# Patient Record
Sex: FEMALE | Race: WHITE | URBAN - METROPOLITAN AREA
[De-identification: names, ages, dates, MRNs, and addresses within clinical notes are randomized per-mention and may not be internally consistent; named-entity substitution may affect disease eponyms.]

---

## 2018-08-22 ENCOUNTER — THERAPY VISIT (OUTPATIENT)
Dept: OCCUPATIONAL THERAPY | Facility: CLINIC | Age: 33
End: 2018-08-22
Payer: MEDICAID

## 2018-08-22 DIAGNOSIS — M25.532 LEFT WRIST PAIN: Primary | ICD-10-CM

## 2018-08-22 PROCEDURE — 97165 OT EVAL LOW COMPLEX 30 MIN: CPT | Mod: GO | Performed by: OCCUPATIONAL THERAPIST

## 2018-08-22 PROCEDURE — 97110 THERAPEUTIC EXERCISES: CPT | Mod: GO | Performed by: OCCUPATIONAL THERAPIST
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
## 2018-08-22 NOTE — MR AVS SNAPSHOT
After Visit Summary   8/22/2018    Olivia Solis    MRN: [id number]           Patient Information     Date Of Birth          1985        Visit Information        Provider Department      8/22/2018 9:00 AM Sylvia Wilkinson OT [facility]        Today's Diagnoses     Left wrist pain    -  1       Follow-ups after your visit        Your next 10 appointments already scheduled     Aug 30, 2018 10:30 AM CDT   [facility] Hand with [facility] ([facility])    [address] [place] [id number] [phone]            Sep 05, 2018 10:00 AM CDT   [facility] Hand with [facility] ([facility])    [address] [place] [address] [id number] [phone]            Sep 12, 2018 10:00 AM CDT   [facility] Hand with [facility] ([facility])    [address] [place] [address] [id number] [phone]              Who to contact     If you have questions or need follow up information about today's clinic visit or your schedule please contact [facility] directly at [phone].  Normal or non-critical lab and imaging results will be communicated to you by MyChart, letter or phone within 4 business days after the clinic has received the results. If you do not hear from us within 7 days, please contact the clinic through MyChart or phone. If you have a critical or abnormal lab result, we will notify you by phone as soon as possible.  Submit refill requests through [employer] or call your pharmacy and they will forward the refill request to us. Please allow 3 business days for your refill to be completed.          Additional Information About Your Visit        Care EveryWhere ID     This is your Care EveryWhere ID. This could be used by other organizations to access your [facility]  records  [id number]         Blood Pressure from Last 3 Encounters:   No data found for BP    Weight from Last 3 Encounters:   No data found for Wt              We Performed the Following     HC OT EVAL, LOW COMPLEXITY     JUNG INITIAL EVAL REPORT     THERAPEUTIC EXERCISES        Primary Care Provider    None Specified       No primary provider on file.        Equal Access to Services     MIGUEL CONTRERAS : Hadii aad clark hadsonao Soomaali, waaxda luqadaha, qaybta kaalmada adeegyada, radha merinorogeriomilly fernandez . So [facility] [phone].    ATENCIÓN: Si habla español, tiene a rivas disposición servicios gratuitos de asistencia lingüística. Llame al [phone].    We comply with applicable federal civil rights laws and [place] laws. We do not discriminate on the basis of race, color, national origin, age, disability, sex, sexual orientation, or gender identity.            Thank you!     Thank you for choosing [facility]  for your care. Our goal is always to provide you with excellent care. Hearing back from our patients is one way we can continue to improve our services. Please take a few minutes to complete the written survey that you may receive in the mail after your visit with us. Thank you!             Your Updated Medication List - Protect others around you: Learn how to safely use, store and throw away your medicines at www.disposemymeds.org.      Notice  As of 8/22/2018 11:47 AM    You have not been prescribed any medications.
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
## 2018-08-30 ENCOUNTER — THERAPY VISIT (OUTPATIENT)
Dept: OCCUPATIONAL THERAPY | Facility: CLINIC | Age: 33
End: 2018-08-30
Payer: MEDICAID

## 2018-08-30 DIAGNOSIS — M25.532 LEFT WRIST PAIN: ICD-10-CM

## 2018-08-30 PROCEDURE — 97763 ORTHC/PROSTC MGMT SBSQ ENC: CPT | Mod: GO | Performed by: OCCUPATIONAL THERAPIST

## 2018-08-30 PROCEDURE — 97535 SELF CARE MNGMENT TRAINING: CPT | Mod: GO | Performed by: OCCUPATIONAL THERAPIST

## 2018-08-31 ENCOUNTER — TELEPHONE (OUTPATIENT)
Dept: ORTHOPEDICS | Facility: CLINIC | Age: 33
End: 2018-08-31

## 2018-08-31 NOTE — TELEPHONE ENCOUNTER
Hand therapist Virginia is referring patient to a hand surgeon to evaluate left wrist DRUJ laxity.  Message left on patient's voicemail to call the clinic.  Appointment can  be scheduled with Dr Peña 09/10/2018.

## 2018-09-05 ENCOUNTER — THERAPY VISIT (OUTPATIENT)
Dept: OCCUPATIONAL THERAPY | Facility: CLINIC | Age: 33
End: 2018-09-05
Payer: MEDICAID

## 2018-09-05 DIAGNOSIS — M25.532 LEFT WRIST PAIN: ICD-10-CM

## 2018-09-05 PROCEDURE — 97140 MANUAL THERAPY 1/> REGIONS: CPT | Mod: GO | Performed by: OCCUPATIONAL THERAPIST

## 2018-09-05 PROCEDURE — 97110 THERAPEUTIC EXERCISES: CPT | Mod: GO | Performed by: OCCUPATIONAL THERAPIST

## 2018-09-05 PROCEDURE — 97035 APP MDLTY 1+ULTRASOUND EA 15: CPT | Mod: GO | Performed by: OCCUPATIONAL THERAPIST

## 2018-09-11 ENCOUNTER — PRE VISIT (OUTPATIENT)
Dept: ORTHOPEDICS | Facility: CLINIC | Age: 33
End: 2018-09-11

## 2018-09-11 NOTE — TELEPHONE ENCOUNTER
FUTURE VISIT INFORMATION      FUTURE VISIT INFORMATION:    Date: 9/12    Time: 11:00    Location: [facility]  REFERRAL INFORMATION:    Referring provider: Roxana Zamora    Referring providers clinic:  [facility]    Reason for visit/diagnosis  left wrist DRUJ laxity    RECORDS REQUESTED FROM:       Clinic name Comments Records Status Imaging Status                                         RECORDS STATUS      All records internal

## 2018-09-12 ENCOUNTER — OFFICE VISIT (OUTPATIENT)
Dept: ORTHOPEDICS | Facility: CLINIC | Age: 33
End: 2018-09-12
Payer: MEDICAID

## 2018-09-12 ENCOUNTER — RADIANT APPOINTMENT (OUTPATIENT)
Dept: GENERAL RADIOLOGY | Facility: CLINIC | Age: 33
End: 2018-09-12
Attending: ORTHOPAEDIC SURGERY
Payer: MEDICAID

## 2018-09-12 ENCOUNTER — THERAPY VISIT (OUTPATIENT)
Dept: OCCUPATIONAL THERAPY | Facility: CLINIC | Age: 33
End: 2018-09-12
Payer: MEDICAID

## 2018-09-12 VITALS — WEIGHT: 128.3 LBS | BODY MASS INDEX: 21.38 KG/M2 | HEIGHT: 65 IN

## 2018-09-12 DIAGNOSIS — M25.532 LEFT WRIST PAIN: Primary | ICD-10-CM

## 2018-09-12 DIAGNOSIS — M25.532 LEFT WRIST PAIN: ICD-10-CM

## 2018-09-12 ASSESSMENT — ENCOUNTER SYMPTOMS
HOT FLASHES: 0
DECREASED LIBIDO: 0

## 2018-09-12 NOTE — LETTER
Date:September 13, 2018      Patient was self referred, no letter generated. Do not send.        [facility] Health Information
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
## 2018-09-12 NOTE — MR AVS SNAPSHOT
"              After Visit Summary   9/12/2018    Olivia Solis    MRN: [id number]           Patient Information     Date Of Birth          1985        Visit Information        Provider Department      9/12/2018 11:00 AM Alen Peña MD [facility]        Today's Diagnoses     Left wrist pain    -  1       Follow-ups after your visit        Future tests that were ordered for you today     Open Future Orders        Priority Expected Expires Ordered    MR Wrist Left w Contrast Routine  9/12/2019 9/12/2018            Who to contact     Please call your clinic at [phone] to:    Ask questions about your health    Make or cancel appointments    Discuss your medicines    Learn about your test results    Speak to your doctor            Additional Information About Your Visit        Care EveryWhere ID     This is your Care EveryWhere ID. This could be used by other organizations to access your [place] medical records  [id number]        Your Vitals Were     Height BMI (Body Mass Index)                1.638 m (5' 4.5\") 21.68 kg/m2           Blood Pressure from Last 3 Encounters:   No data found for BP    Weight from Last 3 Encounters:   09/12/18 58.2 kg (128 lb 4.8 oz)               Primary Care Provider Fax #    Physician No Ref-Primary [phone]       No address on file        Equal Access to Services     WILMA CONTRERAS : Hadii pedro moraleso Sodarshana, waaxda luqadaha, qaybta kaalmada adeegyada, radha springer. So [facility] [phone].    ATENCIÓN: Si habla español, tiene a rivas disposición servicios gratuitos de asistencia lingüística. Llame al [phone].    We comply with applicable federal civil rights laws and [place] laws. We do not discriminate on the basis of race, color, national origin, age, disability, sex, sexual orientation, or gender identity.            Thank you!     Thank you for choosing [facility]  for your care. Our goal " is always to provide you with excellent care. Hearing back from our patients is one way we can continue to improve our services. Please take a few minutes to complete the written survey that you may receive in the mail after your visit with us. Thank you!             Your Updated Medication List - Protect others around you: Learn how to safely use, store and throw away your medicines at www.disposemymeds.org.      Notice  As of 9/12/2018  4:41 PM    You have not been prescribed any medications.
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
## 2018-09-12 NOTE — MR AVS SNAPSHOT
After Visit Summary   9/12/2018    Olivia Solis    MRN: [id number]           Patient Information     Date Of Birth          1985        Visit Information        Provider Department      9/12/2018 12:00 PM Lynn Astorga OT [facility]        Today's Diagnoses     Left wrist pain           Follow-ups after your visit        Future tests that were ordered for you today     Open Future Orders        Priority Expected Expires Ordered    MR Wrist Left w Contrast Routine  9/12/2019 9/12/2018            Who to contact     If you have questions or need follow up information about today's clinic visit or your schedule please contact [facility] directly at [phone].  Normal or non-critical lab and imaging results will be communicated to you by MyChart, letter or phone within 4 business days after the clinic has received the results. If you do not hear from us within 7 days, please contact the clinic through MyChart or phone. If you have a critical or abnormal lab result, we will notify you by phone as soon as possible.  Submit refill requests through [employer] or call your pharmacy and they will forward the refill request to us. Please allow 3 business days for your refill to be completed.          Additional Information About Your Visit        Care EveryWhere ID     This is your Care EveryWhere ID. This could be used by other organizations to access your [facility] medical records  [id number]         Blood Pressure from Last 3 Encounters:   No data found for BP    Weight from Last 3 Encounters:   09/12/18 58.2 kg (128 lb 4.8 oz)              We Performed the Following     NO CHARGE LOS        Primary Care Provider Fax #    Physician No Ref-Primary [phone]       No address on file        Equal Access to Services     MIGUEL CONTRERAS : Avril Wilks, wakeyshawn luqadaha, qaybta kaalmaleia mancini, radha springer. So [facility]  [phone].    ATENCIÓN: Si habla leanna, tiene a rivas disposición servicios gratuitos de asistencia lingüística. Llame al [phone].    We comply with applicable federal civil rights laws and [place] laws. We do not discriminate on the basis of race, color, national origin, age, disability, sex, sexual orientation, or gender identity.            Thank you!     Thank you for choosing [facility]  for your care. Our goal is always to provide you with excellent care. Hearing back from our patients is one way we can continue to improve our services. Please take a few minutes to complete the written survey that you may receive in the mail after your visit with us. Thank you!             Your Updated Medication List - Protect others around you: Learn how to safely use, store and throw away your medicines at www.disposemymeds.org.      Notice  As of 9/12/2018 12:39 PM    You have not been prescribed any medications.

## 2018-09-25 ENCOUNTER — THERAPY VISIT (OUTPATIENT)
Dept: OCCUPATIONAL THERAPY | Facility: CLINIC | Age: 33
End: 2018-09-25
Payer: MEDICAID

## 2018-09-25 ENCOUNTER — HOSPITAL ENCOUNTER (OUTPATIENT)
Age: 33
End: 2018-09-25
Attending: ORTHOPAEDIC SURGERY
Payer: COMMERCIAL

## 2018-09-25 DIAGNOSIS — M25.532 LEFT WRIST PAIN: ICD-10-CM

## 2018-09-25 PROCEDURE — 97110 THERAPEUTIC EXERCISES: CPT | Mod: GO | Performed by: OCCUPATIONAL THERAPIST
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
## 2018-09-25 NOTE — MR AVS SNAPSHOT
After Visit Summary   9/25/2018    Olivia Solis    MRN: [id number]           Patient Information     Date Of Birth          1985        Visit Information        Provider Department      9/25/2018 9:30 AM Roxana Juarez OT [facility]        Today's Diagnoses     Left wrist pain           Follow-ups after your visit        Your next 10 appointments already scheduled     Oct 01, 2018  1:00 PM CDT   XR GADOLINIUM INJECTION with SH MSK RAD, SH IMAGING NURSE   [facility] ([facility])    [address] [address] [phone]           How do I prepare for my exam? (Food and drink instructions) No Food and Drink Restrictions.  How do I prepare for my exam? (Other instructions) You do not need to do anything special for this exam.  What should I wear: Wear comfortable clothes.  How long does the exam take: Most scans take less than 5 minutes.  What should I bring: Bring a list of your medicines, including vitamins, minerals and over-the-counter drugs. It is safest to leave personal items at home.  Do I need a :  No  is needed.  What do I need to tell my doctor: Tell your doctor if there s any chance you are pregnant.  What should I do after the exam: No restrictions, You may resume normal activities.  What is this test: An image of a specific body part shown in shades of black and white.  Who should I call with questions: If you have any questions, please call the Imaging Department where you will have your exam. Directions, parking instructions, and other information is available on our website, [email or web address].            Oct 01, 2018  1:45 PM CDT   MR WRIST LEFT W CONTRAST with SHMRP2   [facility] MRI ([facility])    [address] [address] [phone]           How do I prepare for my exam? (Food and drink instructions) **If you will be receiving sedation  or general anesthesia, please see special notes below.**  How do I prepare for my exam? (Other instructions) Take your medicines as usual, unless your doctor tells you not to. You may or may not receive intravenous (IV) contrast for this exam pending the discretion of the Radiologist.  You do not need to do anything special to prepare.  **If you will be receiving sedation or general anesthesia, please see special notes below.**  What should I wear: The MRI machine uses a strong magnet. Please wear clothes without metal (snaps, zippers). A sweatsuit works well, or we may give you a hospital gown. Please remove any body piercings and hair extensions before you arrive. You will also remove watches, jewelry, hairpins, wallets, dentures, partial dental plates and hearing aids. You may wear contact lenses, and you may be able to wear your rings. We have a safe place to keep your personal items, but it is safer to leave them at home.  How long does the exam take: Most tests take 30 to 60 minutes.  HOWEVER, IF YOUR DOCTOR PRESCRIBES ANESTHESIA please plan on spending four to five hours in the recovery room.  What should I bring:  Bring a list of your current medicines to your exam (including vitamins, minerals and over-the-counter drugs).  Do I need a :  **If you will be receiving sedation or general anesthesia, please see special notes below.**  What should I do after the exam: No Restrictions, You may resume normal activities.  What is this test: MRI (magnetic resonance imaging) uses a strong magnet and radio waves to look inside the body. An MRA (magnetic resonance angiogram) does the same thing, but it lets us look at your blood vessels. A computer turns the radio waves into pictures showing cross sections of the body, much like slices of bread. This helps us see any problems more clearly. You may receive fluid (called  contrast ) before or during your scan. The fluid helps us see the pictures better. We give the  fluid through an IV (small needle in your arm).  Who should I call with questions:  Please call the Imaging Department at your exam site with any questions. Directions, parking instructions, and other information is available on our website, [email or web address].  How do I prepare if I m having sedation or anesthesia? **IMPORTANT** THE INSTRUCTIONS BELOW ARE ONLY FOR THOSE PATIENTS WHO HAVE BEEN TOLD THEY WILL RECEIVE SEDATION OR GENERAL ANESTHESIA DURING THEIR MRI PROCEDURE:  IF YOU WILL RECEIVE SEDATION (take medicine to help you relax during your exam): You must get the medicine from your doctor before you arrive. Bring the medicine to the exam. Do not take it at home. Arrive one hour early. Bring someone who can take you home after the test. Your medicine will make you sleepy. After the exam, you may not drive, take a bus or take a taxi by yourself. No eating 8 hours before your exam. You may have clear liquids up until 4 hours before your exam. (Clear liquids include water, clear tea, black coffee and fruit juice without pulp.)  IF YOU WILL RECEIVE ANESTHESIA (be asleep for your exam): Arrive 1 1/2 hours early. Bring someone who can take you home after the test. You may not drive, take a bus or take a taxi by yourself. No eating 8 hours before your exam. You may have clear liquids up until 4 hours before your exam. (Clear liquids include water, clear tea, black coffee and fruit juice without pulp.)            Oct 05, 2018 11:30 AM CDT   JUNG Hand with Roxana Juarez OT   [facility] ([facility])    [address] [address] [phone]              Who to contact     If you have questions or need follow up information about today's clinic visit or your schedule please contact [facility] directly at [phone].  Normal or non-critical lab and imaging results will be communicated to you by MyChart, letter or phone within 4  business days after the clinic has received the results. If you do not hear from us within 7 days, please contact the clinic through [employer] or phone. If you have a critical or abnormal lab result, we will notify you by phone as soon as possible.  Submit refill requests through [employer] or call your pharmacy and they will forward the refill request to us. Please allow 3 business days for your refill to be completed.          Additional Information About Your Visit        Care EveryWhere ID     This is your Care EveryWhere ID. This could be used by other organizations to access your [facility] medical records  [id number]         Blood Pressure from Last 3 Encounters:   No data found for BP    Weight from Last 3 Encounters:   09/12/18 58.2 kg (128 lb 4.8 oz)              We Performed the Following     THERAPEUTIC EXERCISES        Primary Care Provider Fax #    Physician No Ref-Primary [phone]       No address on file        Equal Access to Services     WILMA CONTRERAS : Hadii pedro Wilks, waaxleia gtz, karel kaalvanessa mancini, radha fernandez . So [facility] [phone].    ATENCIÓN: Si habla español, tiene a rivas disposición servicios gratuitos de asistencia lingüística. Llame al [phone].    We comply with applicable federal civil rights laws and [place] laws. We do not discriminate on the basis of race, color, national origin, age, disability, sex, sexual orientation, or gender identity.            Thank you!     Thank you for choosing [facility]  for your care. Our goal is always to provide you with excellent care. Hearing back from our patients is one way we can continue to improve our services. Please take a few minutes to complete the written survey that you may receive in the mail after your visit with us. Thank you!             Your Updated Medication List - Protect others around you: Learn how to safely use, store and throw away your medicines at  www.disposemymeds.org.      Notice  As of 9/25/2018  5:02 PM    You have not been prescribed any medications.

## 2018-10-02 ENCOUNTER — HOSPITAL ENCOUNTER (OUTPATIENT)
Dept: MRI IMAGING | Facility: CLINIC | Age: 33
End: 2018-10-02
Attending: ORTHOPAEDIC SURGERY
Payer: COMMERCIAL

## 2018-10-02 DIAGNOSIS — M25.532 LEFT WRIST PAIN: ICD-10-CM

## 2018-10-02 PROCEDURE — 73221 MRI JOINT UPR EXTREM W/O DYE: CPT | Mod: LT

## 2018-10-05 ENCOUNTER — THERAPY VISIT (OUTPATIENT)
Dept: OCCUPATIONAL THERAPY | Facility: CLINIC | Age: 33
End: 2018-10-05
Payer: COMMERCIAL

## 2018-10-05 ENCOUNTER — TELEPHONE (OUTPATIENT)
Dept: ORTHOPEDICS | Facility: CLINIC | Age: 33
End: 2018-10-05

## 2018-10-05 DIAGNOSIS — M25.532 LEFT WRIST PAIN: Primary | ICD-10-CM

## 2018-10-05 DIAGNOSIS — M25.532 LEFT WRIST PAIN: ICD-10-CM

## 2018-10-05 PROCEDURE — 97763 ORTHC/PROSTC MGMT SBSQ ENC: CPT | Mod: GO | Performed by: OCCUPATIONAL THERAPIST

## 2018-10-08 DIAGNOSIS — M25.532 LEFT WRIST PAIN: Primary | ICD-10-CM

## 2018-10-08 NOTE — PROGRESS NOTES
New MRI order placed per Dr. Peña.  MRI scheduled for 10/11/2018.  Patient was called with time and date of MRI.  Dr. Peña will call her with results once it is reviewed.

## 2018-10-08 NOTE — PROGRESS NOTES
Called patient to review results. The peripheral attachment of the TFCC looks atypical to me. I discussed with muscloskeletal radiologist- I am not convinced from these images that volar band of SHARRON ligament is primary pathology. He suggested 3T MRI with thin cuts of TFCC with LINA sequences. We will facilitate getting this scheduled promptly.

## 2018-10-11 ENCOUNTER — RADIANT APPOINTMENT (OUTPATIENT)
Dept: MRI IMAGING | Facility: CLINIC | Age: 33
End: 2018-10-11
Attending: ORTHOPAEDIC SURGERY
Payer: COMMERCIAL

## 2018-10-11 DIAGNOSIS — M25.532 LEFT WRIST PAIN: ICD-10-CM

## 2018-10-17 ENCOUNTER — OFFICE VISIT (OUTPATIENT)
Dept: ORTHOPEDICS | Facility: CLINIC | Age: 33
End: 2018-10-17
Payer: COMMERCIAL

## 2018-10-17 VITALS — WEIGHT: 128 LBS | BODY MASS INDEX: 21.33 KG/M2 | HEIGHT: 65 IN

## 2018-10-17 DIAGNOSIS — M25.532 LEFT WRIST PAIN: Primary | ICD-10-CM

## 2018-10-17 RX ORDER — TESTOSTERONE 10 MG/.5G
GEL, METERED TOPICAL EVERY MORNING
COMMUNITY
Start: 2018-03-19

## 2018-10-17 NOTE — PROGRESS NOTES
"[facility]  Orthopedics  Alen Peña MD  10/17/2018     Name: Olivia Solis  MRN: [id number]  Age: 33 year old  : 1985  Referring provider: Referred Self     Chief Complaint: RECHECK (The patient is following up today aftera left wrist MRI. )       History of Present Illness:   Olivia Solis is a 33 year old gender fluid individual, right-hand-dominant, who presents today for follow-up of left wrist pain and weakness. Patient reports that everything is going well currently and she thinks her pain has improved overall. The pain that she does have is localized over the ulnar aspect of the left wrist. She does note that certain high intensity activities, such as rock climbing and hand to hand acrobatics, exacerbate her pain, while other activities do not seem to bother her. Although she feels that her pain is improved, it is often worsened by increasing her volume of activity so she has to cut back. She'll find is that she'll increase her activity, become very painful again, rest, feel better, then ramp up but then having increased pain. She feels that overall the trajectory area for improvement may be positive but she is not sure. Still feels distal ulna apepars somewhat dorsally promiennt.       Review of Systems:   A 10-point review of systems was obtained and is negative except for as noted in the HPI.     Physical Examination:  Height 1.638 m (5' 4.5\"), weight 58.1 kg (128 lb).  General: Healthy appearing female. Affect appropriate. Normal gait. Alert and oriented to surroundings.   On examination of her Left Upper Extremity:   Skin is clean, dry, and intact. No effusion/swelling/deformity. She is nontender over the DRUJ. She has minimal pain over the fovea. She has mild pain over the ulnar styloid. Range of motion of the wrist is full and symmetric to the contralateral side. The DRUJ is perhaps subtly  lax compared to the conralateral side with shuck in neutral, pronation, supination " but piano key sign is negative. Dorsal ulna does not appear overly prominent. No discomfort with ulnar impaction testing. Sensation intact in the median, radial, and ulnar nerve distributions. Cap refill less than two seconds.    Imaging:  MR of Left Wrist (10/17/18)  1. Sprain of dorsal radioulnar ligament with possible intrasubstance  tear, but not full thickness tear.  2. Thinning of triangular fibrocartilage proper central portion  without full thickness tear on this non-arthrographic study. Distal  and proximal lamina on ulnar attachment are intact.  3. Moderate distal radioulnar joint effusion.  4. Mild ECU tendinosis with questionable very mild tenosynovitis.    Addendum:     Provided strong clinical concern of triangular fibrocartilage complex  ulnar attachment site injury/abnormality with the imaging showing  abnormal signal in the dorsal radioulnar ligament continuation to the  expected area of ligamentum subcruentum, superimposed partial tear,  particularly, proximal lamina of the triangular ligament may be  Present.    Per radiology.   On my read, I feel there is a distal radial ulnar joint effusion. There is some altered signal at the peripheral attachments of the TFCC on the fovea and styloid but no obvious tear.    Assessment:   33 year old, right handed female with left wrist pain. There is no olegario tear of the TFCC on MRI but there is some altered signal in it. She does have possibly some mildly increased translation of the DRUJ compared to the contralateral side, but this is subtle. Symptoms at last visit were quite concerning for possible TFCC tear. Today, she appears to be doing much better.     Plan:   It is not clear today if Olivia is doing better overall or if her symptoms have been fluctuating day to day but still relatively stable. I discussed with her that if she does feel that she is overall not progressing, I think it would be very reasonable to go ahead with a wrist arthroscopy to  take a look at her TFCC and repair it if a significant tear is present. However, if she feels she is overall getting better, in light of the lack of a olegario tear on MRI and the relatively minimal clinical signs today, I think it would be more adviseable to continue with a conservative approach. She will think about this and will let me know how she would like to proceed. She would like to see me again when she comes back from [place] to discuss further.     Alen Peña MD           Scribe Disclosure:   I, Orlando Ann, am serving as a scribe to document services personally performed by Alen Peña MD at this visit, based upon the provider's statements to me. All documentation has been reviewed by the aforementioned provider prior to being entered into the official medical record.

## 2018-10-17 NOTE — LETTER
Date:October 18, 2018      Patient was self referred, no letter generated. Do not send.        [facility] Health Information

## 2018-10-17 NOTE — LETTER
"10/17/2018       RE: Olivia Solis  [address] [place] [id number]     Dear Colleague,    Thank you for referring your patient, Olivia Solis, to the [facility] at [facility]. Please see a copy of my visit note below.    [facility]  Orthopedics  Alen Peña MD  10/17/2018     Name: Olivia Solis  MRN: [id number]  Age: 33 year old  : 1985  Referring provider: Referred Self     Chief Complaint: RECHECK (The patient is following up today aftera left wrist MRI. )       History of Present Illness:   Olivia Solis is a 33 year old gender fluid individual, right-hand-dominant, who presents today for follow-up of left wrist pain and weakness. Patient reports that everything is going well currently and she thinks her pain has improved overall. The pain that she does have is localized over the ulnar aspect of the left wrist. She does note that certain high intensity activities, such as rock climbing and hand to hand acrobatics, exacerbate her pain, while other activities do not seem to bother her. Although she feels that her pain is improved, it is often worsened by increasing her volume of activity so she has to cut back. She'll find is that she'll increase her activity, become very painful again, rest, feel better, then ramp up but then having increased pain. She feels that overall the trajectory area for improvement may be positive but she is not sure. Still feels distal ulna apepars somewhat dorsally promiennt.       Review of Systems:   A 10-point review of systems was obtained and is negative except for as noted in the HPI.     Physical Examination:  Height 1.638 m (5' 4.5\"), weight 58.1 kg (128 lb).  General: Healthy appearing female. Affect appropriate. Normal gait. Alert and oriented to surroundings.   On examination of her Left Upper Extremity:   Skin is clean, dry, and intact. No effusion/swelling/deformity. She is " nontender over the DRUJ. She has minimal pain over the fovea. She has mild pain over the ulnar styloid. Range of motion of the wrist is full and symmetric to the contralateral side. The DRUJ is perhaps subtly  lax compared to the conralateral side with shuck in neutral, pronation, supination but piano key sign is negative. Dorsal ulna does not appear overly prominent. No discomfort with ulnar impaction testing. Sensation intact in the median, radial, and ulnar nerve distributions. Cap refill less than two seconds.    Imaging:  MR of Left Wrist (10/17/18)  1. Sprain of dorsal radioulnar ligament with possible intrasubstance  tear, but not full thickness tear.  2. Thinning of triangular fibrocartilage proper central portion  without full thickness tear on this non-arthrographic study. Distal  and proximal lamina on ulnar attachment are intact.  3. Moderate distal radioulnar joint effusion.  4. Mild ECU tendinosis with questionable very mild tenosynovitis.    Addendum:     Provided strong clinical concern of triangular fibrocartilage complex  ulnar attachment site injury/abnormality with the imaging showing  abnormal signal in the dorsal radioulnar ligament continuation to the  expected area of ligamentum subcruentum, superimposed partial tear,  particularly, proximal lamina of the triangular ligament may be  Present.    Per radiology.   On my read, I feel there is a distal radial ulnar joint effusion. There is some altered signal at the peripheral attachments of the TFCC on the fovea and styloid but no obvious tear.    Assessment:   33 year old, right handed female with left wrist pain. There is no olegario tear of the TFCC on MRI but there is some altered signal in it. She does have possibly some mildly increased translation of the DRUJ compared to the contralateral side, but this is subtle. Symptoms at last visit were quite concerning for possible TFCC tear. Today, she appears to be doing much better.     Plan:   It is  not clear today if Olivia is doing better overall or if her symptoms have been fluctuating day to day but still relatively stable. I discussed with her that if she does feel that she is overall not progressing, I think it would be very reasonable to go ahead with a wrist arthroscopy to take a look at her TFCC and repair it if a significant tear is present. However, if she feels she is overall getting better, in light of the lack of a olegario tear on MRI and the relatively minimal clinical signs today, I think it would be more adviseable to continue with a conservative approach. She will think about this and will let me know how she would like to proceed. She would like to see me again when she comes back from [place] to discuss further.     Alen Peña MD           Scribe Disclosure:   I, Orlando Ann, am serving as a scribe to document services personally performed by Alen Peña MD at this visit, based upon the provider's statements to me. All documentation has been reviewed by the aforementioned provider prior to being entered into the official medical record.         Again, thank you for allowing me to participate in the care of your patient.      Sincerely,    Alen Peña MD

## 2018-10-17 NOTE — NURSING NOTE
"Reason For Visit:   Chief Complaint   Patient presents with     RECHECK     The patient is following up today aftera left wrist MRI.        Primary MD: No Ref-Primary, Physician  Ref. MD: self    Age: 33 year old    ?  No      Ht 1.638 m (5' 4.5\")  Wt 58.1 kg (128 lb)  BMI 21.63 kg/m2      Pain Assessment  Patient Currently in Pain: Yes  0-10 Pain Scale: 1  Primary Pain Location: Wrist  Pain Orientation: Left  Pain Descriptors: Aching  Alleviating Factors: Rest  Aggravating Factors: Movement    Hand Dominance Evaluation  Hand Dominance: Right          QuickDASH Assessment  QuickDASH Main 9/12/2018   1.Open a tight or new jar. No difficulty   2. Do heavy household chores (e.g., wash walls, floors) No difficulty   3. Carry a shopping bag or briefcase. No difficulty   4. Wash your back. No difficulty   5. Use a knife to cut food. No difficulty   6. Recreational activities in which you take some force or impact through your arm, shoulder or hand (e.g., golf, hammering, tennis, etc.). No difficulty   7. During the past week, to what extent has your arm, shoulder or hand problem interfered with your normal social activities with family, friends, neighbours or groups? Not at all   8. During the past week, were you limited in your work or other regular daily activities as a result of your arm, shoulder or hand problem? Not limited at all   9. Arm, shoulder or hand pain. Moderate   10.Tingling (pins and needles) in your arm,shoulder or hand. Moderate   11. During the past week, how much difficulty have you had sleeping because of the pain in your arm, shoulder or hand? ([place] number) Mild difficulty   Quickdash Ability Score 11.36          Current Outpatient Prescriptions   Medication Sig Dispense Refill     Testosterone 10 MG/ACT (2%) GEL          Allergies   Allergen Reactions     Bee Venom Hives       Yvrose Wiley, ROSANGELA  "

## 2018-10-17 NOTE — MR AVS SNAPSHOT
"              After Visit Summary   10/17/2018    Olivia Solis    MRN: [id number]           Patient Information     Date Of Birth          1985        Visit Information        Provider Department      10/17/2018 3:45 PM Alen Peña [facility]        Today's Diagnoses     Left wrist pain    -  1       Follow-ups after your visit        Your next 10 appointments already scheduled     Oct 18, 2018 11:30 AM CDT   JUNG Hand with Roxana Juarez OT   [facility] ([facility])    [address] [place] [id number] [phone]            Oct 29, 2018  8:45 AM CDT   (Arrive by 8:30 AM)   RETURN HAND with Alen Peña MD   [facility] ([facility])    [address] [place] [id number] [phone]              Who to contact     Please call your clinic at [phone] to:    Ask questions about your health    Make or cancel appointments    Discuss your medicines    Learn about your test results    Speak to your doctor            Additional Information About Your Visit        Care EveryWhere ID     This is your Care EveryWhere ID. This could be used by other organizations to access your [place] medical records  [id number]        Your Vitals Were     Height BMI (Body Mass Index)                1.638 m (5' 4.5\") 21.63 kg/m2           Blood Pressure from Last 3 Encounters:   No data found for BP    Weight from Last 3 Encounters:   10/17/18 58.1 kg (128 lb)   09/12/18 58.2 kg (128 lb 4.8 oz)              Today, you had the following     No orders found for display       Primary Care Provider Fax #    Physician No Ref-Primary [phone]       No address on file        Equal Access to Services     MIGUEL CONTRERAS : gael Noel, radha fu . So [facility] " [phone].    ATENCIÓN: Si anders ram, tiene a rivas disposición servicios gratuitos de asistencia lingüística. Annabelle al [phone].    We comply with applicable federal civil rights laws and [place] laws. We do not discriminate on the basis of race, color, national origin, age, disability, sex, sexual orientation, or gender identity.            Thank you!     Thank you for choosing [facility]  for your care. Our goal is always to provide you with excellent care. Hearing back from our patients is one way we can continue to improve our services. Please take a few minutes to complete the written survey that you may receive in the mail after your visit with us. Thank you!             Your Updated Medication List - Protect others around you: Learn how to safely use, store and throw away your medicines at www.disposemymeds.org.          This list is accurate as of 10/17/18  6:00 PM.  Always use your most recent med list.                   Brand Name Dispense Instructions for use Diagnosis    Testosterone 10 MG/ACT (2%) Gel

## 2018-10-18 ENCOUNTER — THERAPY VISIT (OUTPATIENT)
Dept: OCCUPATIONAL THERAPY | Facility: CLINIC | Age: 33
End: 2018-10-18
Payer: COMMERCIAL

## 2018-10-18 ENCOUNTER — TELEPHONE (OUTPATIENT)
Dept: ORTHOPEDICS | Facility: CLINIC | Age: 33
End: 2018-10-18

## 2018-10-18 DIAGNOSIS — M25.532 LEFT WRIST PAIN: ICD-10-CM

## 2018-10-18 PROCEDURE — 97110 THERAPEUTIC EXERCISES: CPT | Mod: GO | Performed by: OCCUPATIONAL THERAPIST

## 2018-10-18 PROCEDURE — 97763 ORTHC/PROSTC MGMT SBSQ ENC: CPT | Mod: GO | Performed by: OCCUPATIONAL THERAPIST

## 2018-10-18 NOTE — TELEPHONE ENCOUNTER
Voicemail left for patient to notify her of her PAC appointment.  There was nothing available in the PAC clinic on 10/29/18 when Francia is coming back to see Dr. Peña.  I did go ahead and schedule her for a pre-op on 10/31 at 10am.  If this does not work for patient I have asked for her to call us back to reschedule this.

## 2018-10-26 NOTE — PROGRESS NOTES
"[facility]  Orthopedics  Alen Peña MD  10/29/2018     Name: Olivia Solis  MRN: [id number]  Age: 33 year old  : 1985  Referring provider: Referred Self     Chief Complaint: Left wrist pain and weakness     History of Present Illness:   Olivia Solis is a 33 year old gender fluid individual, right-hand-dominant, who presents today for follow-up of left wrist pain and weakness. When I last saw her, we discussed the possibility of surgery. She was really not sure how she wished to proceed and wished of some more time to think about it and see how she did. She recently went on a trip to [place]. She continued to have difficulty and swelling with certain acrobatics move. The patient reports that her symptoms are overall unchanged since prior evaluation but improved compared to initial evaluation. She denies significant pain but does have some pain with certain motions. She has had intermittent clicking but this is not painful. She has been avoiding handstands, climbing, and certain other activities. Hand to hand causes pain. She has had difficulty tolerating her nighttime brace due to pain. She reports that she has been drinking alcohol and eating less healthy because of the recent trip and feels this may have contributed to her feeling slightly worse. She plans to attend [employer] in March and is hoping to have normal function at that point.     Review of Systems:   A 10-point review of systems was obtained and is negative except for as noted in the HPI.     Physical Examination:  Height 1.638 m (5' 4.5\"), weight 58.1 kg (128 lb).  General: Healthy appearing female. Affect appropriate. Normal gait. Alert and oriented to surroundings.   Left Upper Extremity:   Skin is clean, dry, and intact. There is no effusion. There is minimal tenderness over the fovea. There is no tenderness over the DRUJ or ECU. There is no pain with DRUJ compression. Negative piano key sign. There is slightly " increased laxity with dorsal translation of the ulna in supination compared to the contralateral side. Wrist ROM and forearm rotation symmetric to left. No pain with ulnocarpal impaction.     Imaging:  MR of Left Wrist (10/17/18)  1. Sprain of dorsal radioulnar ligament with possible intrasubstance tear, but not full thickness tear.  2. Thinning of triangular fibrocartilage proper central portion without full thickness tear on this non-arthrographic study. Distal and proximal lamina on ulnar attachment are intact.  3. Moderate distal radioulnar joint effusion.  4. Mild ECU tendinosis with questionable very mild tenosynovitis.     Addendum:  Provided strong clinical concern of triangular fibrocartilage complex ulnar attachment site injury/abnormality with the imaging showing abnormal signal in the dorsal radioulnar ligament continuation to the expected area of ligamentum subcruentum, superimposed partial tear, particularly, proximal lamina of the triangular ligament may be present.  Per radiology.     On my read, I feel there is a distal radial ulnar joint effusion. There is some altered signal at the peripheral attachments of the TFCC on the fovea and styloid but no obvious tear.    I have independently reviewed the above imaging studies; the results were discussed with the patient.     Assessment:   Overall the patient feels that she is not progressing as expected. She continues to have ulnar-sided wrist pain that is limiting her activities. Previous exam was quite concerning for TFCC injury. She does not have focal pain but she does have slight increased DRUJ laxity today compared to the contralateral side. MRI did not show olegario TFCC tearing but did I feel show abnormal signal at the periphery of the TFCC.     Plan:   I had an in-depth discussion with the patient regarding treatment options. One option would be to continue with nonoperative treatment. Although she states she is still limited in her activity  because of her symptoms, clinically, I do feel that she is doing better than when I first saw her and she is minimally tender in the spots that were quite painful to her previously. The alternative would be to perform a diagnostic arthroscopy of the wrist. This would allow me to either debridement or repair a TFCC tear if one were present. The advantage of this is it will allow a more defintivie assessment of the DRUJ and if there is a TFCC tear present, this will allow me to address it. The downside is that complications are always possible with surgery, and that there may infact be no TFCC tear, or the TFCC tear may be healing, in which case surgery may prove unnecessary. She also places extreme demands on her wrists and in either case, I cannot guarantee that she will have a full painfree return to activity int he long run. We also discussed that she is ulnar positive. Sometimes patients with ulnar positivity who have TFCC repair still have pain and go on to need a Ulnar shortening. However, I do not see signs of ulnar impaction currently and so do not recommend USO at this time.  I discussed the risks, benefits, and alternatives to surgery with the patient. I discussed the expected recovery and post-operative course. I discussed the risks and benefits of surgery, including but not limited to: infection, bleeding, pain, scarring, damage to nerves, blood vessels, or other nearby structures, continued instability, stiffness, need for further surgery, wound healing issues, and risks of anesthesia, including heart attack, stroke, and death. The patient expressed understand and informed consent was obtained for left wrist arthroscopy and possible TFCC repair. We will plan to proceed with surgery on 11/13/2018.     20 minutes of face-to-face time was spent with patient at least 50% in face to face counseling.    Alen Peña MD    Scribe Disclosure:   I, Cassandra Leong, am serving as a scribe to document  services personally performed by Alen Peña MD at this visit, based upon the provider's statements to me. All documentation has been reviewed by the aforementioned provider prior to being entered into the official medical record.

## 2018-10-29 ENCOUNTER — OFFICE VISIT (OUTPATIENT)
Dept: ORTHOPEDICS | Facility: CLINIC | Age: 33
End: 2018-10-29
Payer: COMMERCIAL

## 2018-10-29 ENCOUNTER — THERAPY VISIT (OUTPATIENT)
Dept: OCCUPATIONAL THERAPY | Facility: CLINIC | Age: 33
End: 2018-10-29
Payer: COMMERCIAL

## 2018-10-29 VITALS — WEIGHT: 128 LBS | BODY MASS INDEX: 21.33 KG/M2 | HEIGHT: 65 IN

## 2018-10-29 DIAGNOSIS — M25.532 LEFT WRIST PAIN: ICD-10-CM

## 2018-10-29 DIAGNOSIS — S63.592S TFCC (TRIANGULAR FIBROCARTILAGE COMPLEX) TEAR, LEFT, SEQUELA: Primary | ICD-10-CM

## 2018-10-29 PROCEDURE — 97110 THERAPEUTIC EXERCISES: CPT | Mod: GO | Performed by: OCCUPATIONAL THERAPIST

## 2018-10-29 PROCEDURE — 97763 ORTHC/PROSTC MGMT SBSQ ENC: CPT | Mod: GO | Performed by: OCCUPATIONAL THERAPIST
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
## 2018-10-29 NOTE — NURSING NOTE
"Reason For Visit:   Chief Complaint   Patient presents with     RECHECK     The patient is following up today to discuss surgery        Primary MD: No Ref-Primary, Physician  Ref. MD: established     ?  No    Age: 33 year old              Ht 1.638 m (5' 4.5\")  Wt 58.1 kg (128 lb)  BMI 21.63 kg/m2           Hand Dominance Evaluation  Hand Dominance: Right          QuickDASH Assessment  QuickDASH Main 9/12/2018   1.Open a tight or new jar. No difficulty   2. Do heavy household chores (e.g., wash walls, floors) No difficulty   3. Carry a shopping bag or briefcase. No difficulty   4. Wash your back. No difficulty   5. Use a knife to cut food. No difficulty   6. Recreational activities in which you take some force or impact through your arm, shoulder or hand (e.g., golf, hammering, tennis, etc.). No difficulty   7. During the past week, to what extent has your arm, shoulder or hand problem interfered with your normal social activities with family, friends, neighbours or groups? Not at all   8. During the past week, were you limited in your work or other regular daily activities as a result of your arm, shoulder or hand problem? Not limited at all   9. Arm, shoulder or hand pain. Moderate   10.Tingling (pins and needles) in your arm,shoulder or hand. Moderate   11. During the past week, how much difficulty have you had sleeping because of the pain in your arm, shoulder or hand? ([place] number) Mild difficulty   Quickdash Ability Score 11.36          Allergies   Allergen Reactions     Bee Venom Trista Wiley ATC  "

## 2018-10-29 NOTE — MR AVS SNAPSHOT
"              After Visit Summary   10/29/2018    Olivia Solis    MRN: [id number]           Patient Information     Date Of Birth          1985        Visit Information        Provider Department      10/29/2018 8:45 AM Alen Peña MD [facility]        Today's Diagnoses     TFCC (triangular fibrocartilage complex) tear, left, sequela    -  1       Follow-ups after your visit        Your next 10 appointments already scheduled     Oct 31, 2018 10:00 AM CDT   (Arrive by 9:45 AM)   PAC EVALUATION with SUZANNE Marcos [facility] ([facility])    [address]  4th Floor  [facility] [address] [phone]              Who to contact     Please call your clinic at [phone] to:    Ask questions about your health    Make or cancel appointments    Discuss your medicines    Learn about your test results    Speak to your doctor            Additional Information About Your Visit        Care EveryWhere ID     This is your Care EveryWhere ID. This could be used by other organizations to access your [place] medical records  [id number]        Your Vitals Were     Height BMI (Body Mass Index)                1.638 m (5' 4.5\") 21.63 kg/m2           Blood Pressure from Last 3 Encounters:   No data found for BP    Weight from Last 3 Encounters:   10/29/18 58.1 kg (128 lb)   10/17/18 58.1 kg (128 lb)   09/12/18 58.2 kg (128 lb 4.8 oz)              We Performed the Following     Inge-Operative Worksheet (Hand)        Primary Care Provider Fax #    Physician No Ref-Primary [phone]       No address on file        Equal Access to Services     MIGUEL CONTRERAS : Hadii pedro Wilks, waaxda luqadaha, qaybta kaalmada radha mancini. So [facility] [phone].    ATENCIÓN: Si habla español, tiene a rivas disposición servicios gratuitos de asistencia lingüística. Llame al " [phone].    We comply with applicable federal civil rights laws and [place] laws. We do not discriminate on the basis of race, color, national origin, age, disability, sex, sexual orientation, or gender identity.            Thank you!     Thank you for choosing [facility]  for your care. Our goal is always to provide you with excellent care. Hearing back from our patients is one way we can continue to improve our services. Please take a few minutes to complete the written survey that you may receive in the mail after your visit with us. Thank you!             Your Updated Medication List - Protect others around you: Learn how to safely use, store and throw away your medicines at www.disposemymeds.org.          This list is accurate as of 10/29/18  5:19 PM.  Always use your most recent med list.                   Brand Name Dispense Instructions for use Diagnosis    Testosterone 10 MG/ACT (2%) Gel

## 2018-10-29 NOTE — LETTER
Date:October 30, 2018      Patient was self referred, no letter generated. Do not send.        [facility] Health Information

## 2018-10-29 NOTE — LETTER
"10/29/2018       RE: Olivia Solis  [address] [place] [id number]     Dear Colleague,    Thank you for referring your patient, Olivia Solis, to the [facility]. Please see a copy of my visit note below.    [facility]  Orthopedics  Alen Peña MD  10/29/2018     Name: Olivia Solis  MRN: [id number]  Age: 33 year old  : 1985  Referring provider: Referred Self     Chief Complaint: Left wrist pain and weakness     History of Present Illness:   Olivia Solis is a 33 year old gender fluid individual, right-hand-dominant, who presents today for follow-up of left wrist pain and weakness. When I last saw her, we discussed the possibility of surgery. She was really not sure how she wished to proceed and wished of some more time to think about it and see how she did. She recently went on a trip to [place]. She continued to have difficulty and swelling with certain acrobatics move. The patient reports that her symptoms are overall unchanged since prior evaluation but improved compared to initial evaluation. She denies significant pain but does have some pain with certain motions. She has had intermittent clicking but this is not painful. She has been avoiding handstands, climbing, and certain other activities. Hand to hand causes pain. She has had difficulty tolerating her nighttime brace due to pain. She reports that she has been drinking alcohol and eating less healthy because of the recent trip and feels this may have contributed to her feeling slightly worse. She plans to attend [employer] school in March and is hoping to have normal function at that point.     Review of Systems:   A 10-point review of systems was obtained and is negative except for as noted in the HPI.     Physical Examination:  Height 1.638 m (5' 4.5\"), weight 58.1 kg (128 lb).  General: Healthy appearing female. Affect appropriate. Normal gait. " Alert and oriented to surroundings.   Left Upper Extremity:   Skin is clean, dry, and intact. There is no effusion. There is minimal tenderness over the fovea. There is no tenderness over the DRUJ or ECU. There is no pain with DRUJ compression. Negative piano key sign. There is slightly increased laxity with dorsal translation of the ulna in supination compared to the contralateral side. Wrist ROM and forearm rotation symmetric to left. No pain with ulnocarpal impaction.     Imaging:  MR of Left Wrist (10/17/18)  1. Sprain of dorsal radioulnar ligament with possible intrasubstance tear, but not full thickness tear.  2. Thinning of triangular fibrocartilage proper central portion without full thickness tear on this non-arthrographic study. Distal and proximal lamina on ulnar attachment are intact.  3. Moderate distal radioulnar joint effusion.  4. Mild ECU tendinosis with questionable very mild tenosynovitis.     Addendum:  Provided strong clinical concern of triangular fibrocartilage complex ulnar attachment site injury/abnormality with the imaging showing abnormal signal in the dorsal radioulnar ligament continuation to the expected area of ligamentum subcruentum, superimposed partial tear, particularly, proximal lamina of the triangular ligament may be present.  Per radiology.     On my read, I feel there is a distal radial ulnar joint effusion. There is some altered signal at the peripheral attachments of the TFCC on the fovea and styloid but no obvious tear.    I have independently reviewed the above imaging studies; the results were discussed with the patient.     Assessment:   Overall the patient feels that she is not progressing as expected. She continues to have ulnar-sided wrist pain that is limiting her activities. Previous exam was quite concerning for TFCC injury. She does not have focal pain but she does have slight increased DRUJ laxity today compared to the contralateral side. MRI did not show olegario  TFCC tearing but did I feel show abnormal signal at the periphery of the TFCC.     Plan:   I had an in-depth discussion with the patient regarding treatment options. One option would be to continue with nonoperative treatment. Although she states she is still limited in her activity because of her symptoms, clinically, I do feel that she is doing better than when I first saw her and she is minimally tender in the spots that were quite painful to her previously. The alternative would be to perform a diagnostic arthroscopy of the wrist. This would allow me to either debridement or repair a TFCC tear if one were present. The advantage of this is it will allow a more defintivie assessment of the DRUJ and if there is a TFCC tear present, this will allow me to address it. The downside is that complications are always possible with surgery, and that there may infact be no TFCC tear, or the TFCC tear may be healing, in which case surgery may prove unnecessary. She also places extreme demands on her wrists and in either case, I cannot guarantee that she will have a full painfree return to activity int he long run. We also discussed that she is ulnar positive. Sometimes patients with ulnar positivity who have TFCC repair still have pain and go on to need a Ulnar shortening. However, I do not see signs of ulnar impaction currently and so do not recommend USO at this time.  I discussed the risks, benefits, and alternatives to surgery with the patient. I discussed the expected recovery and post-operative course. I discussed the risks and benefits of surgery, including but not limited to: infection, bleeding, pain, scarring, damage to nerves, blood vessels, or other nearby structures, continued instability, stiffness, need for further surgery, wound healing issues, and risks of anesthesia, including heart attack, stroke, and death. The patient expressed understand and informed consent was obtained for left wrist arthroscopy and  possible TFCC repair. We will plan to proceed with surgery on 11/13/2018.     20 minutes of face-to-face time was spent with patient at least 50% in face to face counseling.    Alen Peña MD    Scribe Disclosure:   I, Cassandra Leong, am serving as a scribe to document services personally performed by Alen Peña MD at this visit, based upon the provider's statements to me. All documentation has been reviewed by the aforementioned provider prior to being entered into the official medical record.         Again, thank you for allowing me to participate in the care of your patient.      Sincerely,    Alen Peña MD

## 2018-10-29 NOTE — MR AVS SNAPSHOT
After Visit Summary   10/29/2018    Olivia Solis    MRN: [id number]           Patient Information     Date Of Birth          1985        Visit Information        Provider Department      10/29/2018 9:30 AM Roxana Juarez OT [facility]        Today's Diagnoses     Left wrist pain           Follow-ups after your visit        Your next 10 appointments already scheduled     Oct 31, 2018 10:00 AM CDT   (Arrive by 9:45 AM)   PAC EVALUATION with SUZANNE Marcos [facility] ([facility])    [address] [place] [address]  4th [facility] [address] [phone]              Who to contact     If you have questions or need follow up information about today's clinic visit or your schedule please contact [facility] directly at [phone].  Normal or non-critical lab and imaging results will be communicated to you by MyChart, letter or phone within 4 business days after the clinic has received the results. If you do not hear from us within 7 days, please contact the clinic through MyChart or phone. If you have a critical or abnormal lab result, we will notify you by phone as soon as possible.  Submit refill requests through [employer] or call your pharmacy and they will forward the refill request to us. Please allow 3 business days for your refill to be completed.          Additional Information About Your Visit        Care EveryWhere ID     This is your Care EveryWhere ID. This could be used by other organizations to access your [place] medical records  [id number]         Blood Pressure from Last 3 Encounters:   No data found for BP    Weight from Last 3 Encounters:   10/29/18 58.1 kg (128 lb)   10/17/18 58.1 kg (128 lb)   09/12/18 58.2 kg (128 lb 4.8 oz)              We Performed the Following     C OT ORTHOTICS/PROSTH MGMT &/TRAING SBSQ ENCTR, EA 15 MIN     THERAPEUTIC EXERCISES        Primary Care  Provider Fax #    Physician No Ref-Primary [phone]       No address on file        Equal Access to Services     MIGUEL CONTRERAS : Hadii aad ku hadsonajani Telmadarshana, adánda leloandreyha, karel ladd ashleycheyenne, radha grayin hayaaclarisa ramirezgayathri nieto rebecca springer. So [facility] [phone].    ATENCIÓN: Si habla español, tiene a rivas disposición servicios gratuitos de asistencia lingüística. Llame al [phone].    We comply with applicable federal civil rights laws and [place] laws. We do not discriminate on the basis of race, color, national origin, age, disability, sex, sexual orientation, or gender identity.            Thank you!     Thank you for choosing [facility]  for your care. Our goal is always to provide you with excellent care. Hearing back from our patients is one way we can continue to improve our services. Please take a few minutes to complete the written survey that you may receive in the mail after your visit with us. Thank you!             Your Updated Medication List - Protect others around you: Learn how to safely use, store and throw away your medicines at www.disposemymeds.org.          This list is accurate as of 10/29/18  2:15 PM.  Always use your most recent med list.                   Brand Name Dispense Instructions for use Diagnosis    Testosterone 10 MG/ACT (2%) Gel

## 2018-10-30 ENCOUNTER — HOSPITAL ENCOUNTER (OUTPATIENT)
Facility: AMBULATORY SURGERY CENTER | Age: 33
End: 2018-10-30
Attending: ORTHOPAEDIC SURGERY
Payer: COMMERCIAL

## 2018-10-31 ENCOUNTER — ANESTHESIA EVENT (OUTPATIENT)
Dept: SURGERY | Facility: AMBULATORY SURGERY CENTER | Age: 33
End: 2018-10-31

## 2018-10-31 ENCOUNTER — OFFICE VISIT (OUTPATIENT)
Dept: SURGERY | Facility: CLINIC | Age: 33
End: 2018-10-31
Payer: COMMERCIAL

## 2018-10-31 VITALS
TEMPERATURE: 98 F | RESPIRATION RATE: 16 BRPM | DIASTOLIC BLOOD PRESSURE: 71 MMHG | HEIGHT: 65 IN | SYSTOLIC BLOOD PRESSURE: 128 MMHG | BODY MASS INDEX: 21.31 KG/M2 | WEIGHT: 127.9 LBS | HEART RATE: 77 BPM | OXYGEN SATURATION: 99 %

## 2018-10-31 DIAGNOSIS — Z01.818 PREOP EXAMINATION: Primary | ICD-10-CM

## 2018-10-31 RX ORDER — MULTIPLE VITAMINS W/ MINERALS TAB 9MG-400MCG
1 TAB ORAL EVERY MORNING
COMMUNITY

## 2018-10-31 ASSESSMENT — LIFESTYLE VARIABLES: TOBACCO_USE: 1

## 2018-10-31 NOTE — PATIENT INSTRUCTIONS
Preparing for Your Surgery      Name:  Olivia Solis   MRN:  [id number]   :  1985   Today's Date:  10/31/2018     Arriving for surgery: Left Wrist Arthroscopy   Surgery date:  2018  Arrival time:  6:30 am  Please come to:     [facility]  [address] [place] [id number]     Parking is available in front of the Clinics and Surgery Center building from 5:30AM to 8:00PM.  -  Proceed to the 5th floor to check into the Ambulatory Surgery Center.              >> There will be patient concierges on the 1st and 5th floor, for assistance or an escort, if you would like.              >> Please call [phone] with any questions.    What can I eat or drink?  -  You may have solid food or milk products until 8 hours prior to your surgery. (midnight)  -  You may have water, apple juice or 7up/Sprite until 2 hours prior to your surgery. ( 6 am )    Which medicines can I take?    Stop Aspirin, vitamins and supplements one week prior to surgery.  Hold Ibuprofen and Naproxen for 24 hours prior to surgery.      -  Please take these medications the day of surgery:    NONE      How do I prepare myself?  -  Take two showers: one the night before surgery; and one the morning of surgery.         Use Scrubcare or Hibiclens to wash from neck down.  You may use your own shampoo and conditioner. No other hair products.   -  Do NOT use lotion, powder, deodorant, or antiperspirant the day of your surgery.  -  Do NOT wear any makeup, fingernail polish or jewelry.  - Do not bring your own medications to the hospital, except for inhalers and eye drops.  -  Bring your ID and insurance card.    Questions or Concerns:    -If you are scheduled at the Ambulatory Surgery Center please call [phone].    -For questions after surgery please call your surgeons office.

## 2018-10-31 NOTE — ANESTHESIA PREPROCEDURE EVALUATION
Anesthesia Evaluation     . Pt has not had prior anesthetic            ROS/MED HX    ENT/Pulmonary:     (+)tobacco use, Past use , . .    Neurologic:  - neg neurologic ROS     Cardiovascular:  - neg cardiovascular ROS   (+) ----. : . . . :. . No previous cardiac testing       METS/Exercise Tolerance:  >4 METS   Hematologic:  - neg hematologic  ROS       Musculoskeletal:   (+) , , other musculoskeletal- left wrist injury      GI/Hepatic:  - neg GI/hepatic ROS       Renal/Genitourinary:  - ROS Renal section negative       Endo:  - neg endo ROS       Psychiatric: Comment: Gender fluid, prefers They/she pronouns        Infectious Disease:  - neg infectious disease ROS       Malignancy:      - no malignancy   Other:    (+) C-spine cleared: N/A, no H/O Chronic Pain,no other significant disability                    Physical Exam  Normal systems: dental    Airway   Mallampati: I  TM distance: >3 FB  Neck ROM: full    Dental     Cardiovascular   Rhythm and rate: regular and normal      Pulmonary    breath sounds clear to auscultation    Other findings: For further details of assessment, testing, and physical exam please see H and P completed on same date.           PAC Discussion and Assessment    ASA Classification: 1  Case is suitable for: ASC  Anesthetic techniques and relevant risks discussed: Regional and MAC with GA as backup  Invasive monitoring and risk discussed: No  Types:   Possibility and Risk of blood transfusion discussed: No  NPO instructions given:   Additional anesthetic preparation and risks discussed:   Needs early admission to pre-op area:   Other:     PAC Resident/NP Anesthesia Assessment:  Olivia Solis is a 33 year old female scheduled to undergo left wrist arthroscopy, possible triangular fibrocartilage complex debridement versus repair with Dr. Peña on 11/13/18. She has the following specific operative considerations:   - RCRI : No serious cardiac risks.   - VTE risk: 0.26%  - BRETT # of  risks 0/8   - Risk of PONV score = 3.  If > 2, anti-emetic intervention recommended. If 3 or > anti emetic intervention recommended with two or more meds    No GA history.      --Left wrist injury and pain. Above procedure planned with MAC and regional. Patient comfortable with plan.    --No cardiac history, symptoms or meds. Good exercise tolerance.    --Former smoker, off and on. Last 3-4 years ago. Denies pulmonary symptoms.   --Daily marijuana use.     Patient was reviewed by Dr Britt.        Reviewed and Signed by PAC Mid-Level Provider/Resident  Mid-Level Provider/Resident: SUZANNE Mtz, ALFREDO  Date: 10/31/18  Time: 10:08am    Attending Anesthesiologist Anesthesia Assessment:  33 year old ASA 1 for wrist surgery.    Patient/case discussed with MARTÍN/resident; agree with above assessment. No need to see patient. Patient is appropriate for the planned procedure without further work-up or medical management.      Reviewed and Signed by PAC Anesthesiologist  Anesthesiologist: rj  Date: 10/31/2018  Time:   Pass/Fail: Pass  Disposition:     PAC Pharmacist Assessment:        Pharmacist:   Date:   Time:                           .
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
## 2018-10-31 NOTE — MR AVS SNAPSHOT
After Visit Summary   10/31/2018    Olivia Solis    MRN: [id number]           Patient Information     Date Of Birth          1985        Visit Information        Provider Department      10/31/2018 10:00 AM Anaya Ng APRN CNS [facility] Preoperative Assessment [facility]        Today's Diagnoses     Preop examination    -  1      Care Instructions    Preparing for Your Surgery      Name:  Olivia Solis   MRN:  [id number]   :  1985   Today's Date:  10/31/2018     Arriving for surgery: Left Wrist Arthroscopy   Surgery date:  2018  Arrival time:  6:30 am  Please come to:     [facility]  [address] [place] [id number]     Parking is available in front of the Clinics and Surgery Center building from 5:30AM to 8:00PM.  -  Proceed to the 5th floor to check into the Ambulatory Surgery Center.              >> There will be patient concierges on the 1st and 5th floor, for assistance or an escort, if you would like.              >> Please call [phone] with any questions.    What can I eat or drink?  -  You may have solid food or milk products until 8 hours prior to your surgery. (midnight)  -  You may have water, apple juice or 7up/Sprite until 2 hours prior to your surgery. ( 6 am )    Which medicines can I take?    Stop Aspirin, vitamins and supplements one week prior to surgery.  Hold Ibuprofen and Naproxen for 24 hours prior to surgery.      -  Please take these medications the day of surgery:    NONE      How do I prepare myself?  -  Take two showers: one the night before surgery; and one the morning of surgery.         Use Scrubcare or Hibiclens to wash from neck down.  You may use your own shampoo and conditioner. No other hair products.   -  Do NOT use lotion, powder, deodorant, or antiperspirant the day of your surgery.  -  Do NOT wear any makeup, fingernail polish or jewelry.  - Do not bring your own medications to the  "hospital, except for inhalers and eye drops.  -  Bring your ID and insurance card.    Questions or Concerns:    -If you are scheduled at the Ambulatory Surgery Center please call [phone].    -For questions after surgery please call your surgeons office.                     Follow-ups after your visit        Your next 10 appointments already scheduled     Nov 13, 2018   Procedure with Alen Peña MD   [facility] ([facility])    [address] [place] [address]  5th Floor  [facility] [address] [phone]           Located in the Clinics and Surgery Center at [address] [place] [address].   parking is very convenient and highly recommended.  is a $6 flat rate fee.  Both  and self parkers should enter the main arrival plaza from [facility]; parking attendants will direct you based on your parking preference.              Who to contact     Please call your clinic at [phone] to:    Ask questions about your health    Make or cancel appointments    Discuss your medicines    Learn about your test results    Speak to your doctor            Additional Information About Your Visit        Care EveryWhere ID     This is your Care EveryWhere ID. This could be used by other organizations to access your [place] medical records  [id number]        Your Vitals Were     Pulse Temperature Respirations Height Last Period Pulse Oximetry    77 98  F (36.7  C) (Oral) 16 1.638 m (5' 4.5\") (Approximate) 99%    BMI (Body Mass Index)                   21.61 kg/m2            Blood Pressure from Last 3 Encounters:   10/31/18 128/71    Weight from Last 3 Encounters:   10/31/18 58 kg (127 lb 14.4 oz)   10/29/18 58.1 kg (128 lb)   10/17/18 58.1 kg (128 lb)              Today, you had the following     No orders found for display       Primary Care Provider Fax #    Physician No Ref-Primary [phone]       No address on file        Equal Access to " Services     [facility]: Hadii pedro Wilks, watcda luqadaha, qaybta kanorisleia mancini, radha springer. So [facility] [place] [phone].    ATENCIÓN: Si habla español, tiene a rivas disposición servicios gratuitos de asistencia lingüística. Llame al [phone].    We comply with applicable federal civil rights laws and [place] laws. We do not discriminate on the basis of race, color, national origin, age, disability, sex, sexual orientation, or gender identity.            Thank you!     Thank you for choosing [facility]  for your care. Our goal is always to provide you with excellent care. Hearing back from our patients is one way we can continue to improve our services. Please take a few minutes to complete the written survey that you may receive in the mail after your visit with us. Thank you!             Your Updated Medication List - Protect others around you: Learn how to safely use, store and throw away your medicines at www.disposemymeds.org.          This list is accurate as of 10/31/18 10:55 AM.  Always use your most recent med list.                   Brand Name Dispense Instructions for use Diagnosis    ADVIL PO      Take 200-400 mg by mouth as needed for moderate pain        FOLIC ACID PO      Take 1 mg by mouth twice a week        magnesium sulfate 500 mg/mL Soln      Take by mouth once a week        Multi-vitamin Tabs tablet      Take 1 tablet by mouth every morning        Testosterone 10 MG/ACT (2%) Gel      Place onto the skin every morning

## 2018-11-20 ENCOUNTER — THERAPY VISIT (OUTPATIENT)
Dept: OCCUPATIONAL THERAPY | Facility: CLINIC | Age: 33
End: 2018-11-20
Payer: COMMERCIAL

## 2018-11-20 DIAGNOSIS — M25.532 LEFT WRIST PAIN: ICD-10-CM

## 2018-11-20 PROCEDURE — 97110 THERAPEUTIC EXERCISES: CPT | Mod: GO | Performed by: OCCUPATIONAL THERAPIST

## 2018-11-20 PROCEDURE — 97140 MANUAL THERAPY 1/> REGIONS: CPT | Mod: GO | Performed by: OCCUPATIONAL THERAPIST

## 2018-11-20 NOTE — MR AVS SNAPSHOT
After Visit Summary   11/20/2018    Olivia Solis    MRN: [id number]           Patient Information     Date Of Birth          1985        Visit Information        Provider Department      11/20/2018 11:00 AM Eneida Cutler OT  [facility]        Today's Diagnoses     Left wrist pain           Follow-ups after your visit        Your next 10 appointments already scheduled     Nov 26, 2018  5:00 PM CST   JUNG Hand with Roxana Juarez OT    [facility] ([facility])    [address] [place] [address] [phone]            Dec 03, 2018 12:30 PM CST   JUNG Hand with Roxana Juarez OT    [facility] ([facility])    [address] [place] [address] [phone]              Who to contact     If you have questions or need follow up information about today's clinic visit or your schedule please contact [facility] directly at [phone].  Normal or non-critical lab and imaging results will be communicated to you by MyChart, letter or phone within 4 business days after the clinic has received the results. If you do not hear from us within 7 days, please contact the clinic through MyChart or phone. If you have a critical or abnormal lab result, we will notify you by phone as soon as possible.  Submit refill requests through My eShoe or call your pharmacy and they will forward the refill request to us. Please allow 3 business days for your refill to be completed.          Additional Information About Your Visit        Care EveryWhere ID     This is your Care EveryWhere ID. This could be used by other organizations to access your [place] medical records  [id number]         Blood Pressure from Last 3 Encounters:   10/31/18 128/71    Weight from Last 3 Encounters:   10/31/18 58 kg (127 lb 14.4 oz)   10/29/18 58.1 kg (128 lb)   10/17/18 58.1 kg (128  lb)              We Performed the Following     MANUAL THER TECH,1+REGIONS,EA 15 MIN     THERAPEUTIC EXERCISES        Primary Care Provider Fax #    Physician No Ref-Primary [phone]       No address on file        Equal Access to Services     MIGUEL BEN : Avril pedro rodas fawad Wilks, watcda luqadaha, qaelenata kanorisda live, radha nieto lacurtisclarisa racheal. So [facility] [phone].    ATENCIÓN: Si habla español, tiene a rivas disposición servicios gratuitos de asistencia lingüística. Llame al [phone].    We comply with applicable federal civil rights laws and [place] laws. We do not discriminate on the basis of race, color, national origin, age, disability, sex, sexual orientation, or gender identity.            Thank you!     Thank you for choosing [facility]  for your care. Our goal is always to provide you with excellent care. Hearing back from our patients is one way we can continue to improve our services. Please take a few minutes to complete the written survey that you may receive in the mail after your visit with us. Thank you!             Your Updated Medication List - Protect others around you: Learn how to safely use, store and throw away your medicines at www.disposemymeds.org.          This list is accurate as of 11/20/18  4:07 PM.  Always use your most recent med list.                   Brand Name Dispense Instructions for use Diagnosis    ADVIL PO      Take 200-400 mg by mouth as needed for moderate pain        FOLIC ACID PO      Take 1 mg by mouth twice a week        magnesium sulfate 500 mg/mL Soln      Take by mouth once a week        Multi-vitamin Tabs tablet      Take 1 tablet by mouth every morning        Testosterone 10 MG/ACT (2%) Gel      Place onto the skin every morning

## 2018-11-20 NOTE — PROGRESS NOTES
SOAP note objective information for 11/20/2018.  Range of Motion Wrist AROM (Pain 0-10/10):  Date 8/22/2018 8/22/2018 11/20/18   Side L R    Ext 78 75 73   Flex 70 (3/10) 70 67   UD 25 (2/10) 34 26   RD 21 19 33   Please refer to the daily flowsheet for treatment today, total treatment time and time spent performing 1:1 timed codes.       Home Program:   Exercise:  Wrist stability program--begin with door frame pull backs  Orthosis: Static orthosis (OTC wrist cock up) to avoid pain     Activity: Avoid activities that exacerbate symptoms in the median nerve.  Avoid prolonged flexion or extension of the wrist.  Median nerve gliding  9/25/2018  ECU wrist stability: supinated TB/Tan wrist extension  Ball, plyo throwing, in supination (starting with isoflex sand ball)  10/29/2018  TB for ECU and pronators  Cont wear of DRUJ orthosis for support      Next Visit:  Wrist stability - progress as able  MFR

## 2018-11-26 ENCOUNTER — THERAPY VISIT (OUTPATIENT)
Dept: OCCUPATIONAL THERAPY | Facility: CLINIC | Age: 33
End: 2018-11-26
Payer: COMMERCIAL

## 2018-11-26 DIAGNOSIS — M25.532 LEFT WRIST PAIN: ICD-10-CM

## 2018-11-26 PROCEDURE — 97110 THERAPEUTIC EXERCISES: CPT | Mod: GO | Performed by: OCCUPATIONAL THERAPIST

## 2018-11-26 PROCEDURE — 97112 NEUROMUSCULAR REEDUCATION: CPT | Mod: GO | Performed by: OCCUPATIONAL THERAPIST
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
## 2018-11-26 NOTE — MR AVS SNAPSHOT
After Visit Summary   11/26/2018    Olivia Solis    MRN: [id number]           Patient Information     Date Of Birth          1985        Visit Information        Provider Department      11/26/2018 5:00 PM Roxana Juarez OT [facility]        Today's Diagnoses     Left wrist pain           Follow-ups after your visit        Your next 10 appointments already scheduled     Dec 10, 2018  2:30 PM CST   JUNG Hand with Roxana Juarez OT   [facility] ([facility])    [address] [place] [id number] [phone]            Dec 24, 2018 10:00 AM CST   JUNG Hand with Roxana Juarez OT   [facility] ([facility])    [address] [place] [id number] [phone]              Who to contact     If you have questions or need follow up information about today's clinic visit or your schedule please contact [facility] directly at [phone].  Normal or non-critical lab and imaging results will be communicated to you by MyChart, letter or phone within 4 business days after the clinic has received the results. If you do not hear from us within 7 days, please contact the clinic through MyChart or phone. If you have a critical or abnormal lab result, we will notify you by phone as soon as possible.  Submit refill requests through [employer] or call your pharmacy and they will forward the refill request to us. Please allow 3 business days for your refill to be completed.          Additional Information About Your Visit        Care EveryWhere ID     This is your Care EveryWhere ID. This could be used by other organizations to access your [place] medical records  [id number]         Blood Pressure from Last 3 Encounters:   10/31/18 128/71    Weight from Last 3 Encounters:   10/31/18 58 kg (127 lb 14.4 oz)   10/29/18 58.1 kg (128 lb)   10/17/18 58.1 kg (128 lb)               We Performed the Following     JUNG RE-CERT REPORT     NEUROMUSCULAR RE-EDUCATION     THERAPEUTIC EXERCISES        Primary Care Provider Fax #    Physician No Ref-Primary [phone]       No address on file        Equal Access to Services     MIGUEL CONTRERAS : Hadii aad ku hadmadelyn Wilks, gael alfonsocary, karel katori mancini, radha nieto lacurtisclarisa springer. So [facility] [phone].    ATENCIÓN: Si habla español, tiene a rivas disposición servicios gratuitos de asistencia lingüística. Llame al [phone].    We comply with applicable federal civil rights laws and [place] laws. We do not discriminate on the basis of race, color, national origin, age, disability, sex, sexual orientation, or gender identity.            Thank you!     Thank you for choosing [facility]  for your care. Our goal is always to provide you with excellent care. Hearing back from our patients is one way we can continue to improve our services. Please take a few minutes to complete the written survey that you may receive in the mail after your visit with us. Thank you!             Your Updated Medication List - Protect others around you: Learn how to safely use, store and throw away your medicines at www.disposemymeds.org.          This list is accurate as of 11/26/18  6:38 PM.  Always use your most recent med list.                   Brand Name Dispense Instructions for use Diagnosis    ADVIL PO      Take 200-400 mg by mouth as needed for moderate pain        FOLIC ACID PO      Take 1 mg by mouth twice a week        magnesium sulfate 500 mg/mL Soln      Take by mouth once a week        Multi-vitamin Tabs tablet      Take 1 tablet by mouth every morning        Testosterone 10 MG/ACT (2%) Gel      Place onto the skin every morning
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
## 2018-12-10 ENCOUNTER — THERAPY VISIT (OUTPATIENT)
Dept: OCCUPATIONAL THERAPY | Facility: CLINIC | Age: 33
End: 2018-12-10
Payer: COMMERCIAL

## 2018-12-10 DIAGNOSIS — M25.532 LEFT WRIST PAIN: Primary | ICD-10-CM

## 2018-12-10 PROCEDURE — 97763 ORTHC/PROSTC MGMT SBSQ ENC: CPT | Mod: GO | Performed by: OCCUPATIONAL THERAPIST

## 2018-12-10 PROCEDURE — 97110 THERAPEUTIC EXERCISES: CPT | Mod: GO | Performed by: OCCUPATIONAL THERAPIST

## 2018-12-31 ENCOUNTER — THERAPY VISIT (OUTPATIENT)
Dept: OCCUPATIONAL THERAPY | Facility: CLINIC | Age: 33
End: 2018-12-31
Payer: COMMERCIAL

## 2018-12-31 DIAGNOSIS — M25.532 LEFT WRIST PAIN: ICD-10-CM

## 2018-12-31 PROCEDURE — 97110 THERAPEUTIC EXERCISES: CPT | Mod: GO | Performed by: OCCUPATIONAL THERAPIST
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
## 2019-05-01 PROBLEM — M25.532 LEFT WRIST PAIN: Status: RESOLVED | Noted: 2018-08-22 | Resolved: 2019-05-01

## 2023-01-31 ENCOUNTER — LAB REQUISITION (OUTPATIENT)
Dept: LAB | Facility: CLINIC | Age: 38
End: 2023-01-31
Payer: COMMERCIAL

## 2023-01-31 DIAGNOSIS — Z00.00 ENCOUNTER FOR GENERAL ADULT MEDICAL EXAMINATION WITHOUT ABNORMAL FINDINGS: ICD-10-CM

## 2023-01-31 PROCEDURE — 87624 HPV HI-RISK TYP POOLED RSLT: CPT | Mod: ORL | Performed by: FAMILY MEDICINE

## 2023-01-31 PROCEDURE — G0145 SCR C/V CYTO,THINLAYER,RESCR: HCPCS | Mod: ORL | Performed by: FAMILY MEDICINE

## 2023-02-02 LAB
BKR LAB AP GYN ADEQUACY: NORMAL
BKR LAB AP GYN INTERPRETATION: NORMAL
BKR LAB AP HPV REFLEX: NORMAL
BKR LAB AP LMP: NORMAL
BKR LAB AP PREVIOUS ABNORMAL: NORMAL
PATH REPORT.COMMENTS IMP SPEC: NORMAL
PATH REPORT.COMMENTS IMP SPEC: NORMAL
PATH REPORT.RELEVANT HX SPEC: NORMAL

## 2023-02-06 LAB
HUMAN PAPILLOMA VIRUS 16 DNA: NEGATIVE
HUMAN PAPILLOMA VIRUS 18 DNA: NEGATIVE
HUMAN PAPILLOMA VIRUS FINAL DIAGNOSIS: ABNORMAL
HUMAN PAPILLOMA VIRUS OTHER HR: POSITIVE

## 2025-05-06 ENCOUNTER — LAB REQUISITION (OUTPATIENT)
Dept: LAB | Facility: CLINIC | Age: 40
End: 2025-05-06
Payer: COMMERCIAL

## 2025-05-06 DIAGNOSIS — Z13.220 ENCOUNTER FOR SCREENING FOR LIPOID DISORDERS: ICD-10-CM

## 2025-05-06 LAB
CHOLEST SERPL-MCNC: 201 MG/DL
FASTING STATUS PATIENT QL REPORTED: ABNORMAL
HDLC SERPL-MCNC: 60 MG/DL
LDLC SERPL CALC-MCNC: 120 MG/DL
NONHDLC SERPL-MCNC: 141 MG/DL
TRIGL SERPL-MCNC: 106 MG/DL

## 2025-05-27 ENCOUNTER — LAB REQUISITION (OUTPATIENT)
Dept: LAB | Facility: CLINIC | Age: 40
End: 2025-05-27
Payer: COMMERCIAL

## 2025-05-27 DIAGNOSIS — Z12.4 ENCOUNTER FOR SCREENING FOR MALIGNANT NEOPLASM OF CERVIX: ICD-10-CM

## 2025-05-27 DIAGNOSIS — Z11.3 ENCOUNTER FOR SCREENING FOR INFECTIONS WITH A PREDOMINANTLY SEXUAL MODE OF TRANSMISSION: ICD-10-CM

## 2025-05-28 LAB
C TRACH DNA SPEC QL PROBE+SIG AMP: NEGATIVE
HPV HR 12 DNA CVX QL NAA+PROBE: NEGATIVE
HPV16 DNA CVX QL NAA+PROBE: NEGATIVE
HPV18 DNA CVX QL NAA+PROBE: NEGATIVE
HUMAN PAPILLOMA VIRUS FINAL DIAGNOSIS: NORMAL
N GONORRHOEA DNA SPEC QL NAA+PROBE: NEGATIVE
SPECIMEN TYPE: NORMAL

## 2025-05-30 LAB
BKR AP ASSOCIATED HPV REPORT: NORMAL
BKR LAB AP GYN ADEQUACY: NORMAL
BKR LAB AP GYN INTERPRETATION: NORMAL
BKR LAB AP LMP: NORMAL
BKR LAB AP PREVIOUS ABNL DX: NORMAL
BKR LAB AP PREVIOUS ABNORMAL: NORMAL
PATH REPORT.COMMENTS IMP SPEC: NORMAL
PATH REPORT.COMMENTS IMP SPEC: NORMAL
PATH REPORT.RELEVANT HX SPEC: NORMAL

## 2025-07-15 ENCOUNTER — LAB REQUISITION (OUTPATIENT)
Dept: LAB | Facility: CLINIC | Age: 40
End: 2025-07-15
Payer: COMMERCIAL

## 2025-07-15 DIAGNOSIS — F64.0 TRANSSEXUALISM: ICD-10-CM

## 2025-07-15 LAB
ALBUMIN SERPL BCG-MCNC: 4.4 G/DL (ref 3.5–5.2)
ALP SERPL-CCNC: 59 U/L (ref 40–150)
ALT SERPL W P-5'-P-CCNC: 23 U/L (ref 0–50)
ANION GAP SERPL CALCULATED.3IONS-SCNC: 13 MMOL/L (ref 7–15)
AST SERPL W P-5'-P-CCNC: 19 U/L (ref 0–45)
BILIRUB SERPL-MCNC: 0.4 MG/DL
BUN SERPL-MCNC: 14 MG/DL (ref 6–20)
CALCIUM SERPL-MCNC: 9.3 MG/DL (ref 8.8–10.4)
CHLORIDE SERPL-SCNC: 103 MMOL/L (ref 98–107)
CREAT SERPL-MCNC: 0.7 MG/DL (ref 0.51–0.95)
EGFRCR SERPLBLD CKD-EPI 2021: >90 ML/MIN/1.73M2
GLUCOSE SERPL-MCNC: 84 MG/DL (ref 70–99)
HCO3 SERPL-SCNC: 24 MMOL/L (ref 22–29)
POTASSIUM SERPL-SCNC: 3.5 MMOL/L (ref 3.4–5.3)
PROT SERPL-MCNC: 7 G/DL (ref 6.4–8.3)
SHBG SERPL-SCNC: 72 NMOL/L (ref 30–135)
SODIUM SERPL-SCNC: 140 MMOL/L (ref 135–145)

## 2025-07-16 ENCOUNTER — TRANSCRIBE ORDERS (OUTPATIENT)
Dept: OTHER | Age: 40
End: 2025-07-16

## 2025-07-16 DIAGNOSIS — N84.1 CERVICAL POLYP: Primary | ICD-10-CM

## 2025-07-17 ENCOUNTER — PATIENT OUTREACH (OUTPATIENT)
Dept: CARE COORDINATION | Facility: CLINIC | Age: 40
End: 2025-07-17
Payer: COMMERCIAL

## 2025-07-17 LAB
TESTOST FREE SERPL-MCNC: 0.13 NG/DL
TESTOST SERPL-MCNC: 12 NG/DL (ref 8–60)

## 2025-07-21 ENCOUNTER — PATIENT OUTREACH (OUTPATIENT)
Dept: CARE COORDINATION | Facility: CLINIC | Age: 40
End: 2025-07-21
Payer: COMMERCIAL